# Patient Record
Sex: FEMALE | Race: WHITE | NOT HISPANIC OR LATINO | Employment: FULL TIME | URBAN - METROPOLITAN AREA
[De-identification: names, ages, dates, MRNs, and addresses within clinical notes are randomized per-mention and may not be internally consistent; named-entity substitution may affect disease eponyms.]

---

## 2023-10-11 ENCOUNTER — OFFICE VISIT (OUTPATIENT)
Dept: NEUROSURGERY | Facility: CLINIC | Age: 48
End: 2023-10-11
Payer: COMMERCIAL

## 2023-10-11 VITALS — BODY MASS INDEX: 28.61 KG/M2 | HEIGHT: 68 IN | WEIGHT: 188.8 LBS

## 2023-10-11 DIAGNOSIS — S22.050A CLOSED WEDGE COMPRESSION FRACTURE OF T5 VERTEBRA, INITIAL ENCOUNTER: Primary | ICD-10-CM

## 2023-10-11 RX ORDER — BUSPIRONE HYDROCHLORIDE 10 MG/1
10 TABLET ORAL
COMMUNITY
Start: 2023-09-20 | End: 2023-12-19

## 2023-10-11 RX ORDER — OMEPRAZOLE 20 MG/1
20 CAPSULE, DELAYED RELEASE ORAL
COMMUNITY
Start: 2023-09-20

## 2023-10-11 RX ORDER — DESVENLAFAXINE 100 MG/1
1 TABLET, EXTENDED RELEASE ORAL DAILY
COMMUNITY
Start: 2023-09-20 | End: 2023-12-19

## 2023-10-11 RX ORDER — MULTIVITAMIN WITH IRON
250 TABLET ORAL
COMMUNITY

## 2023-10-11 RX ORDER — MELATONIN
1000 DAILY
COMMUNITY

## 2023-10-11 NOTE — PROGRESS NOTES
Patient: Santa Arias  : 1975    Primary Care Provider: Nichole Contreras PA    Requesting Provider: As above        History    Chief Complaint: Mid back pain with numbness and tingling.    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  She has had some chronic low back pain.  On 2023 she fell over a creek bank and struck her back on a soil ledge.  For a while she had severe back pain but that is largely dissipated.  She has some numbness and tingling in her shoulder blade regions, right greater than left.  She has a bit of sternal pain occasionally but no radicular thoracic pain.  She denies any leg symptoms.  She has no bowel or bladder dysfunction.  She is back to work.    Review of Systems   Constitutional:  Positive for fatigue. Negative for activity change, appetite change, chills, diaphoresis, fever and unexpected weight change.   HENT:  Positive for congestion. Negative for dental problem, drooling, ear discharge, ear pain, facial swelling, hearing loss, mouth sores, nosebleeds, postnasal drip, rhinorrhea, sinus pressure, sinus pain, sneezing, sore throat, tinnitus, trouble swallowing and voice change.    Eyes:  Negative for photophobia, pain, discharge, redness, itching and visual disturbance.   Respiratory:  Negative for apnea, cough, choking, chest tightness, shortness of breath, wheezing and stridor.    Cardiovascular:  Negative for chest pain, palpitations and leg swelling.   Gastrointestinal:  Negative for abdominal distention, abdominal pain, anal bleeding, blood in stool, constipation, diarrhea, nausea, rectal pain and vomiting.   Endocrine: Positive for cold intolerance. Negative for heat intolerance, polydipsia, polyphagia and polyuria.   Genitourinary:  Negative for decreased urine volume, difficulty urinating, dyspareunia, dysuria, enuresis, flank pain, frequency, genital sores, hematuria, menstrual problem, pelvic pain, urgency, vaginal  "bleeding, vaginal discharge and vaginal pain.   Musculoskeletal:  Positive for arthralgias and back pain. Negative for gait problem, joint swelling, myalgias, neck pain and neck stiffness.   Skin:  Negative for color change, pallor, rash and wound.   Allergic/Immunologic: Positive for environmental allergies. Negative for food allergies and immunocompromised state.   Neurological:  Positive for numbness and headaches. Negative for dizziness, tremors, seizures, syncope, facial asymmetry, speech difficulty, weakness and light-headedness.   Hematological:  Negative for adenopathy. Does not bruise/bleed easily.   Psychiatric/Behavioral:  Positive for dysphoric mood. Negative for agitation, behavioral problems, confusion, decreased concentration, hallucinations, self-injury, sleep disturbance and suicidal ideas. The patient is not nervous/anxious and is not hyperactive.        The patient's past medical history, past surgical history, family history, and social history have been reviewed at length in the electronic medical record.      Physical Exam:   Ht 172.7 cm (68\")   Wt 85.6 kg (188 lb 12.8 oz)   BMI 28.71 kg/mý   CONSTITUTIONAL: Patient is well-nourished, pleasant and appears stated age.  MUSCULOSKELETAL:  Straight leg raising is negative.  Manuel's Sign is negative.  ROM in the low back is normal.  Tenderness in the back to palpation is not observed.  NEUROLOGICAL:  Orientation, memory, attention span, language function, and cognition have been examined and are intact.  Strength is intact in the lower extremities to direct testing.  Muscle tone is normal throughout.  Station and gait are normal.  Sensation is intact to light touch testing throughout.  Deep tendon reflexes are 1+ and symmetrical.  Mateo signs are negative.  No clonus is elicited at the ankles.  Coordination is intact.      Medical Decision Making    Data Review:   (All imaging studies were personally reviewed unless stated otherwise)  MRI of " the thoracic spine dated 8/18/2023 demonstrates a subacute wedge deformity of T5.  There is mild buckling of the posterior endplate that narrows the spinal canal a bit but there is no almita compromise of the spinal cord itself.  There is no signal change within the spinal cord.    A DEXA scan suggests osteopenia.    Diagnosis:   T5 compression fracture in the setting of a fall and diminished bone density.    Treatment Options:   At this juncture the patient has very modest symptoms.  She does not require surgical intervention.  She develops further difficulties then she will follow-up with me.  She may continue to work without restriction.    Scribed for Nik Valverde MD by Sita Womack CMA on 10/11/2023 13:48 EDT         I, Dr. Valverde, personally performed the services described in the documentation, as scribed in my presence, and it is both accurate and complete.